# Patient Record
Sex: FEMALE | Race: WHITE | NOT HISPANIC OR LATINO | URBAN - NONMETROPOLITAN AREA
[De-identification: names, ages, dates, MRNs, and addresses within clinical notes are randomized per-mention and may not be internally consistent; named-entity substitution may affect disease eponyms.]

---

## 2016-08-30 PROBLEM — H52.4: Noted: 2019-12-30

## 2016-08-30 PROBLEM — H25.13: Noted: 2019-12-30

## 2016-08-30 PROBLEM — H16.223: Noted: 2019-12-30

## 2019-12-30 ENCOUNTER — IMPORTED ENCOUNTER (OUTPATIENT)
Dept: URBAN - NONMETROPOLITAN AREA CLINIC 1 | Facility: CLINIC | Age: 49
End: 2019-12-30

## 2019-12-30 PROBLEM — H52.4: Noted: 2019-12-30

## 2019-12-30 PROBLEM — H16.223: Noted: 2019-12-30

## 2019-12-30 PROBLEM — H25.13: Noted: 2019-12-30

## 2019-12-30 PROCEDURE — 92014 COMPRE OPH EXAM EST PT 1/>: CPT

## 2019-12-30 PROCEDURE — 92015 DETERMINE REFRACTIVE STATE: CPT

## 2019-12-30 NOTE — PATIENT DISCUSSION
Compound Hyperopic Astigmatism OU w/Presbyopia-  discussed findings w/patient-  new spectacle Rx issued-  discussed scleral lenses w/patient-  continue to monitor yearly or prns/p RK w/scarring OU-  discussed findings w/patient-  no changes noted at this time-  continue to monitor yearly or prnDES OU-  discussed findings w/patient-  very dry eyes patient has been on Restasis in the past but has not used it recently-  start Restasis BID OU Rx sent today-  continue to monitor yearly or prn; 's Notes: MR 12/30/2019<br />DFE 12/30/2019<br />

## 2021-12-06 NOTE — PROCEDURE NOTE: CLINICAL
PROCEDURE NOTE: Punctal Plugs, Kim Stewart ([id number], [id number]) #2 Bilateral Lower Lids. Diagnosis: Dysfunctional Tear Syndrome. Prior to treatment, the risks/benefits/alternatives were discussed. The patient wished to proceed with procedure. Temporary collagen plugs were inserted. Patient tolerated procedure well. There were no complications. Post procedure instructions given. Keren Funez

## 2022-01-13 ENCOUNTER — IMPORTED ENCOUNTER (OUTPATIENT)
Dept: URBAN - NONMETROPOLITAN AREA CLINIC 1 | Facility: CLINIC | Age: 52
End: 2022-01-13

## 2022-01-13 PROCEDURE — 92014 COMPRE OPH EXAM EST PT 1/>: CPT

## 2022-01-13 PROCEDURE — 92015 DETERMINE REFRACTIVE STATE: CPT

## 2022-01-13 NOTE — PATIENT DISCUSSION
Compound Hyperopic Astigmatism OU w/Presbyopia-  discussed findings w/patient-  new spectacle Rx issued-  discussed scleral lenses w/patient-  continue to monitor yearly or prns/p RK w/scarring OU-  discussed findings w/patient-  no changes noted at this time-  continue to monitor yearly or prnDES-Explained AMINATA and associated symptoms.-Recommend increasing Omega 3s.-Pt to begin artificial tears OU QID PRN. Pt will contact us if this does not provide relief.  Consider punctal plugs in that case.; 's Notes: MR 12/30/2019DFE 12/30/2019

## 2022-04-09 ASSESSMENT — TONOMETRY
OS_IOP_MMHG: 16
OD_IOP_MMHG: 16
OD_IOP_MMHG: 16
OS_IOP_MMHG: 16

## 2022-04-09 ASSESSMENT — VISUAL ACUITY
OD_CC: J1
OS_SC: 20/30
OD_SC: 20/40
OS_SC: 20/30+2
OD_SC: 20/30
OS_CC: J2
OD_CC: J2
OS_CC: J1

## 2023-01-31 ENCOUNTER — ESTABLISHED PATIENT (OUTPATIENT)
Dept: RURAL CLINIC 1 | Facility: CLINIC | Age: 53
End: 2023-01-31

## 2023-01-31 DIAGNOSIS — H52.03: ICD-10-CM

## 2023-01-31 DIAGNOSIS — H52.223: ICD-10-CM

## 2023-01-31 DIAGNOSIS — H52.4: ICD-10-CM

## 2023-01-31 DIAGNOSIS — H25.13: ICD-10-CM

## 2023-01-31 DIAGNOSIS — H16.223: ICD-10-CM

## 2023-01-31 PROCEDURE — 92015 DETERMINE REFRACTIVE STATE: CPT

## 2023-01-31 PROCEDURE — 92014 COMPRE OPH EXAM EST PT 1/>: CPT

## 2023-01-31 ASSESSMENT — TONOMETRY
OS_IOP_MMHG: 16
OD_IOP_MMHG: 16

## 2023-01-31 ASSESSMENT — VISUAL ACUITY
OS_CC: 20/25
OD_CC: 20/30

## 2023-01-31 NOTE — PATIENT DISCUSSION
Compound Hyperopic Astigmatism OU w/Presbyopia-  discussed findings w/patient-  new spectacle Rx issued-  discussed scleral lenses w/patient-  continue to monitor yearly or prns/p RK w/scarring OU-  discussed findings w/patient-  no changes noted at this time-  continue to monitor yearly or prnDES-Explained AMINATA and associated symptoms.-Recommend increasing Omega 3s.-Pt to begin artificial tears OU QID PRN. Pt will contact us if this does not provide relief. Consider punctal plugs in that case.; 's Notes: MR 12/30/2019DFE 12/30/2019.

## 2024-02-01 ENCOUNTER — ESTABLISHED PATIENT (OUTPATIENT)
Dept: RURAL CLINIC 1 | Facility: CLINIC | Age: 54
End: 2024-02-01

## 2024-02-01 DIAGNOSIS — H25.13: ICD-10-CM

## 2024-02-01 DIAGNOSIS — H16.223: ICD-10-CM

## 2024-02-01 DIAGNOSIS — Z98.890: ICD-10-CM

## 2024-02-01 DIAGNOSIS — H52.4: ICD-10-CM

## 2024-02-01 DIAGNOSIS — H52.03: ICD-10-CM

## 2024-02-01 DIAGNOSIS — H52.223: ICD-10-CM

## 2024-02-01 PROCEDURE — 92015 DETERMINE REFRACTIVE STATE: CPT

## 2024-02-01 PROCEDURE — 92014 COMPRE OPH EXAM EST PT 1/>: CPT

## 2024-02-01 ASSESSMENT — VISUAL ACUITY
OS_CC: 20/25
OU_CC: 20/25
OU_CC: 20/25-1
OD_CC: 20/25
OS_CC: 20/30
OD_CC: 20/40-1

## 2024-02-01 ASSESSMENT — TONOMETRY
OS_IOP_MMHG: 16
OD_IOP_MMHG: 16

## 2025-02-27 ENCOUNTER — COMPREHENSIVE EXAM (OUTPATIENT)
Age: 55
End: 2025-02-27

## 2025-02-27 DIAGNOSIS — H52.4: ICD-10-CM

## 2025-02-27 DIAGNOSIS — H16.223: ICD-10-CM

## 2025-02-27 DIAGNOSIS — H52.223: ICD-10-CM

## 2025-02-27 DIAGNOSIS — H52.03: ICD-10-CM

## 2025-02-27 DIAGNOSIS — Z98.890: ICD-10-CM

## 2025-02-27 DIAGNOSIS — H25.13: ICD-10-CM

## 2025-02-27 PROCEDURE — 92014 COMPRE OPH EXAM EST PT 1/>: CPT

## 2025-02-27 PROCEDURE — 92015 DETERMINE REFRACTIVE STATE: CPT

## 2025-03-26 ENCOUNTER — CONSULTATION/EVALUATION (OUTPATIENT)
Age: 55
End: 2025-03-26

## 2025-03-26 DIAGNOSIS — H52.213: ICD-10-CM

## 2025-03-26 DIAGNOSIS — Z98.890: ICD-10-CM

## 2025-03-26 DIAGNOSIS — H25.813: ICD-10-CM

## 2025-03-26 PROCEDURE — 92025 CPTRIZED CORNEAL TOPOGRAPHY: CPT | Mod: NC

## 2025-03-26 PROCEDURE — 99214 OFFICE O/P EST MOD 30 MIN: CPT

## 2025-03-26 PROCEDURE — 92134 CPTRZ OPH DX IMG PST SGM RTA: CPT | Mod: NC

## 2025-03-26 PROCEDURE — 92136 OPHTHALMIC BIOMETRY: CPT

## 2025-04-28 ENCOUNTER — PRE-OP/H&P (OUTPATIENT)
Age: 55
End: 2025-04-28

## 2025-04-28 VITALS
SYSTOLIC BLOOD PRESSURE: 128 MMHG | HEIGHT: 67 IN | HEART RATE: 64 BPM | DIASTOLIC BLOOD PRESSURE: 76 MMHG | WEIGHT: 178 LBS | BODY MASS INDEX: 27.94 KG/M2

## 2025-04-28 DIAGNOSIS — Z01.818: ICD-10-CM

## 2025-04-28 DIAGNOSIS — J45.20: ICD-10-CM

## 2025-04-28 DIAGNOSIS — F41.9: ICD-10-CM

## 2025-04-28 DIAGNOSIS — E03.9: ICD-10-CM

## 2025-04-28 DIAGNOSIS — K21.9: ICD-10-CM

## 2025-04-28 PROCEDURE — 99213 OFFICE O/P EST LOW 20 MIN: CPT

## 2025-05-19 ENCOUNTER — POST-OP (OUTPATIENT)
Age: 55
End: 2025-05-19

## 2025-05-19 ENCOUNTER — SURGERY/PROCEDURE (OUTPATIENT)
Age: 55
End: 2025-05-19

## 2025-05-19 DIAGNOSIS — Z96.1: ICD-10-CM

## 2025-05-19 DIAGNOSIS — H25.812: ICD-10-CM

## 2025-05-19 DIAGNOSIS — H25.811: ICD-10-CM

## 2025-05-19 PROCEDURE — 66984CV REMOVE CATARACT, INSERT LENS, CUSTOM VISION

## 2025-05-19 PROCEDURE — 99199PCV PROF CUSTOM VISION PACKAGE

## 2025-05-19 PROCEDURE — 99024 POSTOP FOLLOW-UP VISIT: CPT

## 2025-05-27 ENCOUNTER — POST-OP (OUTPATIENT)
Age: 55
End: 2025-05-27

## 2025-05-27 DIAGNOSIS — Z96.1: ICD-10-CM

## 2025-05-27 PROCEDURE — 99024 POSTOP FOLLOW-UP VISIT: CPT

## 2025-06-02 ENCOUNTER — PRE-OP/H&P (OUTPATIENT)
Age: 55
End: 2025-06-02

## 2025-06-02 VITALS
HEIGHT: 67 IN | SYSTOLIC BLOOD PRESSURE: 124 MMHG | BODY MASS INDEX: 27.94 KG/M2 | DIASTOLIC BLOOD PRESSURE: 72 MMHG | WEIGHT: 178 LBS | HEART RATE: 70 BPM

## 2025-06-02 DIAGNOSIS — E03.9: ICD-10-CM

## 2025-06-02 DIAGNOSIS — F41.9: ICD-10-CM

## 2025-06-02 DIAGNOSIS — Z01.818: ICD-10-CM

## 2025-06-02 DIAGNOSIS — J45.20: ICD-10-CM

## 2025-06-02 DIAGNOSIS — K21.9: ICD-10-CM

## 2025-06-02 PROCEDURE — 99213 OFFICE O/P EST LOW 20 MIN: CPT | Mod: NC

## 2025-06-09 ENCOUNTER — POST-OP (OUTPATIENT)
Age: 55
End: 2025-06-09

## 2025-06-09 DIAGNOSIS — Z96.1: ICD-10-CM

## 2025-06-09 PROCEDURE — 99024 POSTOP FOLLOW-UP VISIT: CPT

## 2025-06-18 ENCOUNTER — POST-OP (OUTPATIENT)
Age: 55
End: 2025-06-18

## 2025-07-03 ENCOUNTER — POST-OP (OUTPATIENT)
Age: 55
End: 2025-07-03

## 2025-07-03 DIAGNOSIS — Z96.1: ICD-10-CM

## 2025-07-03 PROCEDURE — 99024 POSTOP FOLLOW-UP VISIT: CPT
